# Patient Record
Sex: MALE | Race: WHITE | NOT HISPANIC OR LATINO | Employment: OTHER | URBAN - METROPOLITAN AREA
[De-identification: names, ages, dates, MRNs, and addresses within clinical notes are randomized per-mention and may not be internally consistent; named-entity substitution may affect disease eponyms.]

---

## 2016-04-20 LAB — CRC RECOMMENDATION EXT: NORMAL

## 2023-06-21 LAB
CHOLEST SERPL-MSCNC: 147 MG/DL (ref 0–200)
HDLC SERPL-MCNC: 67 MG/DL (ref 35–70)
LDLC SERPL CALC-MCNC: 65 MG/DL (ref 0–160)
TRIGL SERPL-MCNC: 41 MG/DL (ref 40–160)

## 2023-07-05 ENCOUNTER — DOCUMENTATION ONLY (OUTPATIENT)
Dept: FAMILY MEDICINE | Facility: CLINIC | Age: 68
End: 2023-07-05
Payer: MEDICARE

## 2023-07-05 PROBLEM — E87.5 HYPERKALEMIA: Status: ACTIVE | Noted: 2023-07-05

## 2023-07-05 PROBLEM — E53.8 FOLATE DEFICIENCY: Status: ACTIVE | Noted: 2023-07-05

## 2023-07-05 PROBLEM — I10 HTN (HYPERTENSION): Status: ACTIVE | Noted: 2023-07-05

## 2023-07-05 PROBLEM — D64.9 MILD ANEMIA: Status: ACTIVE | Noted: 2023-07-05

## 2023-07-05 PROBLEM — E05.90 HYPERTHYROIDISM: Status: ACTIVE | Noted: 2023-07-05

## 2023-07-05 PROBLEM — R79.89 ELEVATED LFTS: Status: ACTIVE | Noted: 2023-07-05

## 2023-07-05 PROBLEM — E78.5 ELEVATED LIPIDS: Status: ACTIVE | Noted: 2023-07-05

## 2023-07-05 RX ORDER — AMLODIPINE BESYLATE 5 MG/1
5 TABLET ORAL DAILY
COMMUNITY
End: 2023-10-19 | Stop reason: SDUPTHER

## 2023-07-05 RX ORDER — ATORVASTATIN CALCIUM 10 MG/1
10 TABLET, FILM COATED ORAL NIGHTLY
COMMUNITY
End: 2023-10-19 | Stop reason: SDUPTHER

## 2023-07-05 RX ORDER — LANOLIN ALCOHOL/MO/W.PET/CERES
100 CREAM (GRAM) TOPICAL DAILY
COMMUNITY
End: 2023-07-06 | Stop reason: ALTCHOICE

## 2023-07-05 RX ORDER — CYANOCOBALAMIN (VITAMIN B-12) 1000 MCG
500 TABLET, SUBLINGUAL SUBLINGUAL DAILY
COMMUNITY
End: 2023-07-06 | Stop reason: ALTCHOICE

## 2023-07-06 ENCOUNTER — OFFICE VISIT (OUTPATIENT)
Dept: FAMILY MEDICINE | Facility: CLINIC | Age: 68
End: 2023-07-06
Payer: MEDICARE

## 2023-07-06 VITALS
WEIGHT: 141.63 LBS | TEMPERATURE: 98 F | HEIGHT: 65 IN | SYSTOLIC BLOOD PRESSURE: 104 MMHG | OXYGEN SATURATION: 95 % | DIASTOLIC BLOOD PRESSURE: 74 MMHG | HEART RATE: 72 BPM | BODY MASS INDEX: 23.6 KG/M2

## 2023-07-06 DIAGNOSIS — E55.9 VITAMIN D DEFICIENCY: ICD-10-CM

## 2023-07-06 DIAGNOSIS — R79.89 ELEVATED LFTS: ICD-10-CM

## 2023-07-06 DIAGNOSIS — E78.5 ELEVATED LIPIDS: ICD-10-CM

## 2023-07-06 DIAGNOSIS — D72.819 LEUKOPENIA, UNSPECIFIED TYPE: ICD-10-CM

## 2023-07-06 DIAGNOSIS — F17.200 HEAVY SMOKER: ICD-10-CM

## 2023-07-06 DIAGNOSIS — F10.90 ALCOHOL USE DISORDER: ICD-10-CM

## 2023-07-06 DIAGNOSIS — F10.988 ALCOHOL-INDUCED THROMBOCYTOPENIA: ICD-10-CM

## 2023-07-06 DIAGNOSIS — K70.10 ALCOHOLIC HEPATITIS WITHOUT ASCITES: Primary | ICD-10-CM

## 2023-07-06 DIAGNOSIS — D69.59 ALCOHOL-INDUCED THROMBOCYTOPENIA: ICD-10-CM

## 2023-07-06 DIAGNOSIS — I10 PRIMARY HYPERTENSION: ICD-10-CM

## 2023-07-06 PROBLEM — H26.9 UNSPECIFIED CATARACT: Status: ACTIVE | Noted: 2023-07-06

## 2023-07-06 PROBLEM — Z79.899 ENCOUNTER FOR LONG-TERM (CURRENT) USE OF OTHER MEDICATIONS: Status: ACTIVE | Noted: 2023-07-06

## 2023-07-06 PROBLEM — D69.6 THROMBOCYTOPENIA: Status: ACTIVE | Noted: 2023-07-06

## 2023-07-06 PROBLEM — F10.10 EXCESSIVE DRINKING OF ALCOHOL: Status: ACTIVE | Noted: 2023-07-06

## 2023-07-06 PROCEDURE — 1126F AMNT PAIN NOTED NONE PRSNT: CPT | Mod: CPTII,,, | Performed by: FAMILY MEDICINE

## 2023-07-06 PROCEDURE — 1160F RVW MEDS BY RX/DR IN RCRD: CPT | Mod: CPTII,,, | Performed by: FAMILY MEDICINE

## 2023-07-06 PROCEDURE — 1126F PR PAIN SEVERITY QUANTIFIED, NO PAIN PRESENT: ICD-10-PCS | Mod: CPTII,,, | Performed by: FAMILY MEDICINE

## 2023-07-06 PROCEDURE — 3008F BODY MASS INDEX DOCD: CPT | Mod: CPTII,,, | Performed by: FAMILY MEDICINE

## 2023-07-06 PROCEDURE — 3288F FALL RISK ASSESSMENT DOCD: CPT | Mod: CPTII,,, | Performed by: FAMILY MEDICINE

## 2023-07-06 PROCEDURE — 99215 PR OFFICE/OUTPT VISIT, EST, LEVL V, 40-54 MIN: ICD-10-PCS | Mod: ,,, | Performed by: FAMILY MEDICINE

## 2023-07-06 PROCEDURE — 1159F PR MEDICATION LIST DOCUMENTED IN MEDICAL RECORD: ICD-10-PCS | Mod: CPTII,,, | Performed by: FAMILY MEDICINE

## 2023-07-06 PROCEDURE — 99215 OFFICE O/P EST HI 40 MIN: CPT | Mod: ,,, | Performed by: FAMILY MEDICINE

## 2023-07-06 PROCEDURE — 1101F PR PT FALLS ASSESS DOC 0-1 FALLS W/OUT INJ PAST YR: ICD-10-PCS | Mod: CPTII,,, | Performed by: FAMILY MEDICINE

## 2023-07-06 PROCEDURE — 3288F PR FALLS RISK ASSESSMENT DOCUMENTED: ICD-10-PCS | Mod: CPTII,,, | Performed by: FAMILY MEDICINE

## 2023-07-06 PROCEDURE — 3078F PR MOST RECENT DIASTOLIC BLOOD PRESSURE < 80 MM HG: ICD-10-PCS | Mod: CPTII,,, | Performed by: FAMILY MEDICINE

## 2023-07-06 PROCEDURE — 1101F PT FALLS ASSESS-DOCD LE1/YR: CPT | Mod: CPTII,,, | Performed by: FAMILY MEDICINE

## 2023-07-06 PROCEDURE — 3074F SYST BP LT 130 MM HG: CPT | Mod: CPTII,,, | Performed by: FAMILY MEDICINE

## 2023-07-06 PROCEDURE — 3008F PR BODY MASS INDEX (BMI) DOCUMENTED: ICD-10-PCS | Mod: CPTII,,, | Performed by: FAMILY MEDICINE

## 2023-07-06 PROCEDURE — 1160F PR REVIEW ALL MEDS BY PRESCRIBER/CLIN PHARMACIST DOCUMENTED: ICD-10-PCS | Mod: CPTII,,, | Performed by: FAMILY MEDICINE

## 2023-07-06 PROCEDURE — 3078F DIAST BP <80 MM HG: CPT | Mod: CPTII,,, | Performed by: FAMILY MEDICINE

## 2023-07-06 PROCEDURE — 1159F MED LIST DOCD IN RCRD: CPT | Mod: CPTII,,, | Performed by: FAMILY MEDICINE

## 2023-07-06 PROCEDURE — 3074F PR MOST RECENT SYSTOLIC BLOOD PRESSURE < 130 MM HG: ICD-10-PCS | Mod: CPTII,,, | Performed by: FAMILY MEDICINE

## 2023-07-06 RX ORDER — ACETAMINOPHEN 500 MG
2000 TABLET ORAL DAILY
Start: 2023-07-06

## 2023-07-06 NOTE — ASSESSMENT & PLAN NOTE
Most recent  U/L and  U/L on 6/21/23.  His alk-phos is normal at 56.  His ferritin level is elevated at 1955.  We had a long discussion about his continued alcohol consumption.  Unfortunately this was not the 1st discussion we have had about this matter.  I have repeatedly warned him about the resulting consequences of his alcohol consumption.  These consequences include but are not limited to cirrhosis of the liver as well as liver cancer.  I strongly advised him to quit or at least cut down his beer consumption.  I provided alcohol cessation counseling and offered to refer the patient to someone who specializes in alcohol cessation.  Unfortunately the patient was not interested in any of this.  I will continue to monitor this patient's liver functions as well as any other abnormal labs.

## 2023-07-06 NOTE — PROGRESS NOTES
"  Patient Name: Beka Pena     Patient ID: [id number]     Chief Complaint: Results (Patient here for lab results.)      HPI:     Beka Pena is a 68 y.o. male here today for lab work results. Reviewed and discussed lab work results.  Patient has a history of elevated LFTs secondary to excessive alcohol (beer) consumption.  Patient relates to me that he drinks close to a 6 pack of beer daily.  Past Medical History:   Diagnosis Date    B12 deficiency     Elevated LFTs     Due to excessive ETOH consumption    Elevated lipids     Folate deficiency     Heavy smoker     HTN (hypertension)     Hyperkalemia     Hyperthyroidism     Mild anemia     Unspecified cataract     Right Eye        Past Surgical History:   Procedure Laterality Date    TONSILLECTOMY AND ADENOIDECTOMY          Social History     Socioeconomic History    Marital status: Single    Number of children: 2   Tobacco Use    Smoking status: Every Day     Packs/day: 1.00     Years: 30.00     Pack years: 30.00     Types: Cigarettes    Smokeless tobacco: Never   Substance and Sexual Activity    Alcohol use: Yes     Alcohol/week: 14.0 standard drinks     Types: 14 Cans of beer per week    Drug use: Never        Current Outpatient Medications   Medication Instructions    amLODIPine (NORVASC) 5 mg, Oral, Daily    atorvastatin (LIPITOR) 10 mg, Oral, Nightly    cholecalciferol (vitamin D3) (VITAMIN D3) 2,000 Units, Oral, Daily       Review of patient's allergies indicates:  No Known Allergies       There is no immunization history on file for this patient.    Patient Care Team:  Joe Nelson Sr., MD as PCP - General (Family Medicine)     Subjective:     Review of Systems    10 point review of systems conducted, negative except as stated in the history of present illness. See HPI for details.    Objective:     Visit Vitals  /74 (BP Location: Left arm, Patient Position: Sitting, BP Method: Medium (Manual))   Pulse 72   Temp 98.1 °F (36.7 °C)   Ht 5' 5" " (1.651 m)   Wt 64.2 kg (141 lb 9.6 oz)   SpO2 95%   BMI 23.56 kg/m²       Physical Exam  Constitutional:       Appearance: Normal appearance.   HENT:      Head: Normocephalic and atraumatic.   Cardiovascular:      Rate and Rhythm: Normal rate and regular rhythm.      Heart sounds: Normal heart sounds.   Pulmonary:      Effort: Pulmonary effort is normal.      Breath sounds: Normal breath sounds.   Abdominal:      General: Abdomen is flat.      Palpations: Abdomen is soft.      Tenderness: There is no abdominal tenderness.      Comments: No ascites.  No right upper quadrant tenderness; liver is not palpable.   Musculoskeletal:         General: No swelling or tenderness. Normal range of motion.      Cervical back: Normal range of motion and neck supple.      Right lower leg: No edema.      Left lower leg: No edema.   Lymphadenopathy:      Cervical: No cervical adenopathy.   Skin:     General: Skin is warm and dry.   Neurological:      General: No focal deficit present.      Mental Status: He is alert and oriented to person, place, and time.   Psychiatric:         Mood and Affect: Mood normal.         Assessment:       ICD-10-CM ICD-9-CM   1. Alcoholic hepatitis without ascites  K70.10 571.1   2. Elevated LFTs  R79.89 790.6   3. Elevated lipids  E78.5 272.4   4. Primary hypertension  I10 401.9   5. Heavy smoker  F17.200 305.1   6. Vitamin D deficiency  E55.9 268.9   7. Leukopenia, unspecified type  D72.819 288.50   8. Alcohol use disorder  F10.90 V49.89   9. Alcohol-induced thrombocytopenia  F10.988 287.49    D69.59         Plan:     1. Alcoholic hepatitis without ascites  -     Gamma GT; Future; Expected date: 08/06/2023  -     Gamma GT; Future; Expected date: 11/06/2023    2. Elevated LFTs  Overview:  Patient's elevated LFTs secondary to excessive beer consumption.  He relates that he drinks close to a 6 pack of beer daily.    Assessment & Plan:  Most recent  U/L and  U/L on 6/21/23.  His alk-phos is  normal at 56.  His ferritin level is elevated at 1955.  We had a long discussion about his continued alcohol consumption.  Unfortunately this was not the 1st discussion we have had about this matter.  I have repeatedly warned him about the resulting consequences of his alcohol consumption.  These consequences include but are not limited to cirrhosis of the liver as well as liver cancer.  I strongly advised him to quit or at least cut down his beer consumption.  I provided alcohol cessation counseling and offered to refer the patient to someone who specializes in alcohol cessation.  Unfortunately the patient was not interested in any of this.  I will continue to monitor this patient's liver functions as well as any other abnormal labs.    Orders:  -     Gamma GT; Future; Expected date: 08/06/2023  -     Actin (Smooth Muscle) Antibody (IgG); Future; Expected date: 08/06/2023  -     LIVER-KIDNEY MICROSOME ABS; Future; Expected date: 08/06/2023  -     Mitochondrial (M2) Antibody; Future; Expected date: 08/06/2023  -     Comprehensive Metabolic Panel; Future; Expected date: 08/06/2023  -     Hepatitis B Core Antibody, Total; Future; Expected date: 08/06/2023  -     Hepatitis B Surface Ab, Qualitative; Future; Expected date: 08/06/2023  -     Hepatitis B Surface Antigen; Future; Expected date: 08/06/2023  -     Hepatitis C Antibody; Future; Expected date: 08/06/2023  -     Hepatitis A antibody, IgG; Future; Expected date: 08/06/2023  -     CT Abdomen Without Contrast; Future; Expected date: 08/06/2023  -     Comprehensive Metabolic Panel; Future; Expected date: 11/06/2023  -     Gamma GT; Future; Expected date: 11/06/2023    3. Elevated lipids  Overview:  Continue with Lipitor 10 mg nightly.  Patient is well controlled.  Stressed importance of dietary modifications. Follow a low cholesterol, low saturated fat diet with less that 200mg of cholesterol a day.  Avoid fried foods and high saturated fats (high saturated fats  less than 7% of calories).  Add Flax Seed/Fish Oil supplements to diet. Increase dietary fiber.  Regular exercise can reduce LDL and raise HDL. Stressed importance of physical activity 5 times per week for 30 minutes per day.     Assessment & Plan:  Most recent LDL 65 mg/dL on 6/21/23.      4. Primary hypertension  Overview:  Continue with Norvasc 5 mg daily.  Patient is well controlled.  Low Sodium Diet (DASH Diet - Less than 2 grams of sodium per day).  Monitor blood pressure daily and log. Report consistent numbers greater than 140/90.  Maintain healthy weight with goal BMI <30. Exercise 30 minutes per day, 5 days per week.  Patient was instructed to return to the office in 1 week for a blood pressure check as his blood pressure is on the low side today.  If it remains this low we will go ahead and stop his Norvasc.      5. Heavy smoker  Overview:  Patient was offered smoking cessation techniques such as nicotine gum or patches.  They were also offered a more regimented smoking cessation program.  They were advised to decrease the number of cigarettes by 1 every few days until they completely stopped.  It was strongly recommended that they set a quit date and stick to it.  And finally, important health reasons to stop smoking were given to the patient. Approximately    minutes was spent discussing smoking cessation.      6. Vitamin D deficiency  Assessment & Plan:  Most recent Vitamin D 24 ng/mL on 6/21/23.    Orders:  -     cholecalciferol, vitamin D3, (VITAMIN D3) 50 mcg (2,000 unit) Cap capsule; Take 1 capsule (2,000 Units total) by mouth once daily.    7. Leukopenia, unspecified type  Overview:  Most recent WBC 3.5 thous/cumm  on 6/21/23.  I explained to the patient that his white cell count is related to his body's immune function and capability.  I explained to him that his depressed white count is probably secondary to the alcohol.  Should his white cell count continue to go down he he will be at increased  risk for infection.    Orders:  -     Ferritin; Future; Expected date: 08/06/2023  -     Vitamin B12; Future; Expected date: 08/06/2023  -     Folate; Future; Expected date: 08/06/2023  -     Iron; Future; Expected date: 08/06/2023  -     CBC Auto Differential; Future; Expected date: 08/06/2023  -     CBC Auto Differential; Future; Expected date: 11/06/2023  -     APTT; Future; Expected date: 11/06/2023  -     Protime-INR; Future; Expected date: 11/06/2023    8. Alcohol use disorder  Overview:  Problems related to their alcohol use were discussed, including but not limited to liver inflammation cirrhosis and possible cancer.  Bleeding and blood disorders may also develop.  Patient was made aware that if they had other health problems such as hypertension or diabetes, alcohol consumption could possibly make these things worse.  Alcohol cessation counseling was given and it was strongly recommended that the patient stop or at least cut down on alcohol consumption.  The patient was offered a more formal alcohol cessation program such as AA or similar programs.  They were also offered inpatient therapy if that would be necessary.      9. Alcohol-induced thrombocytopenia  Overview:  Patient was made aware that his platelet count was below normal.  This is no doubt due to his excessive alcohol consumption.  He was cautioned about excessive bleeding if he happens to cut himself.  I told him that this was just 1 more reason that he needed to cut down or stop his alcohol use.          [x] Discussed lab findings with the patient.  [] Discussed diet, exercise and if appropriate, weight loss.  [x] Instructions and information, including risks and benefits of prescribed medication(s) have been reviewed with the patient and patient verbalizes understanding. Questions have been answered to the patient's satisfaction.  [] Appropriate counseling has been given regarding anxiety and depressive issues that were discussed today.  []  Any lab drawn today will be reviewed by physician at the time it is received and appropriate recommendations bill be made and discussed with patient.     Follow up in about 4 months (around 11/6/2023) for Labwork 1 month, Follow Up, With Fasting Labs prior to visit.   In addition to their scheduled follow up, the patient has also been instructed to follow up on as needed basis.     Future Appointments   Date Time Provider Department Center   8/7/2023  8:00 AM NURSE, [facility]   11/6/2023  8:15 AM MD [facility]   11/8/2023  8:15 AM Joe Nelson Sr., MD [facility] Nelson Sr, MD

## 2023-08-03 ENCOUNTER — TELEPHONE (OUTPATIENT)
Dept: FAMILY MEDICINE | Facility: CLINIC | Age: 68
End: 2023-08-03
Payer: MEDICARE

## 2023-08-07 ENCOUNTER — TELEPHONE (OUTPATIENT)
Dept: FAMILY MEDICINE | Facility: CLINIC | Age: 68
End: 2023-08-07

## 2023-08-07 PROBLEM — D69.6 THROMBOCYTOPENIA: Status: ACTIVE | Noted: 2023-08-07

## 2023-08-07 PROBLEM — D69.59 ALCOHOL-INDUCED THROMBOCYTOPENIA: Status: RESOLVED | Noted: 2023-07-06 | Resolved: 2023-08-07

## 2023-08-07 PROBLEM — K70.10 ALCOHOLIC HEPATITIS: Status: RESOLVED | Noted: 2023-07-06 | Resolved: 2023-08-07

## 2023-08-07 PROBLEM — F10.988 ALCOHOL-INDUCED THROMBOCYTOPENIA: Status: RESOLVED | Noted: 2023-07-06 | Resolved: 2023-08-07

## 2023-08-07 NOTE — TELEPHONE ENCOUNTER
----- Message from Joe Nelson Sr., MD sent at 7/19/2023 12:56 PM CDT -----  CT scan shows he has a fatty liver but no cirrhosis at this time.  He should be told that with continued drinking this fatty liver can eventually turned into cirrhosis.  They also noted that he has to fractures in his lower back that do not look serious.  Ask him if he has had any recent back injury.

## 2023-10-19 DIAGNOSIS — I10 PRIMARY HYPERTENSION: Primary | ICD-10-CM

## 2023-10-19 DIAGNOSIS — E78.5 ELEVATED LIPIDS: Primary | ICD-10-CM

## 2023-10-19 RX ORDER — AMLODIPINE BESYLATE 5 MG/1
5 TABLET ORAL DAILY
Qty: 90 TABLET | Refills: 3 | Status: SHIPPED | OUTPATIENT
Start: 2023-10-19

## 2023-10-19 RX ORDER — ATORVASTATIN CALCIUM 10 MG/1
10 TABLET, FILM COATED ORAL NIGHTLY
Qty: 90 TABLET | Refills: 3 | Status: SHIPPED | OUTPATIENT
Start: 2023-10-19 | End: 2023-10-20 | Stop reason: ALTCHOICE

## 2023-10-20 DIAGNOSIS — E78.5 ELEVATED LIPIDS: ICD-10-CM

## 2023-10-20 DIAGNOSIS — I10 PRIMARY HYPERTENSION: ICD-10-CM

## 2023-10-20 RX ORDER — ROSUVASTATIN CALCIUM 10 MG/1
10 TABLET, COATED ORAL DAILY
Qty: 90 TABLET | Refills: 1 | Status: SHIPPED | OUTPATIENT
Start: 2023-10-20

## 2023-11-06 PROCEDURE — 82977 ASSAY OF GGT: CPT | Performed by: FAMILY MEDICINE

## 2023-11-06 PROCEDURE — 85025 COMPLETE CBC W/AUTO DIFF WBC: CPT | Performed by: FAMILY MEDICINE

## 2023-11-06 PROCEDURE — 86376 MICROSOMAL ANTIBODY EACH: CPT | Performed by: FAMILY MEDICINE

## 2023-11-06 PROCEDURE — 86803 HEPATITIS C AB TEST: CPT | Performed by: FAMILY MEDICINE

## 2023-11-06 PROCEDURE — 82746 ASSAY OF FOLIC ACID SERUM: CPT | Performed by: FAMILY MEDICINE

## 2023-11-06 PROCEDURE — 80053 COMPREHEN METABOLIC PANEL: CPT | Performed by: FAMILY MEDICINE

## 2023-11-06 PROCEDURE — 86704 HEP B CORE ANTIBODY TOTAL: CPT | Performed by: FAMILY MEDICINE

## 2023-11-06 PROCEDURE — 82728 ASSAY OF FERRITIN: CPT | Performed by: FAMILY MEDICINE

## 2023-11-06 PROCEDURE — 86708 HEPATITIS A ANTIBODY: CPT | Performed by: FAMILY MEDICINE

## 2023-11-06 PROCEDURE — 83516 IMMUNOASSAY NONANTIBODY: CPT | Performed by: FAMILY MEDICINE

## 2023-11-06 PROCEDURE — 85610 PROTHROMBIN TIME: CPT | Performed by: FAMILY MEDICINE

## 2023-11-06 PROCEDURE — 83540 ASSAY OF IRON: CPT | Performed by: FAMILY MEDICINE

## 2023-11-06 PROCEDURE — 85730 THROMBOPLASTIN TIME PARTIAL: CPT | Performed by: FAMILY MEDICINE

## 2023-11-06 PROCEDURE — 82607 VITAMIN B-12: CPT | Performed by: FAMILY MEDICINE

## 2024-02-01 ENCOUNTER — DOCUMENTATION ONLY (OUTPATIENT)
Dept: FAMILY MEDICINE | Facility: CLINIC | Age: 69
End: 2024-02-01

## 2024-04-18 DIAGNOSIS — E78.5 ELEVATED LIPIDS: ICD-10-CM

## 2024-04-18 RX ORDER — ROSUVASTATIN CALCIUM 10 MG/1
10 TABLET, COATED ORAL DAILY
Qty: 90 TABLET | Refills: 0 | Status: SHIPPED | OUTPATIENT
Start: 2024-04-18

## 2024-07-01 PROCEDURE — 80053 COMPREHEN METABOLIC PANEL: CPT | Performed by: FAMILY MEDICINE

## 2024-07-01 PROCEDURE — 80061 LIPID PANEL: CPT | Performed by: FAMILY MEDICINE

## 2024-07-01 PROCEDURE — 84443 ASSAY THYROID STIM HORMONE: CPT | Performed by: FAMILY MEDICINE

## 2024-07-01 PROCEDURE — 84550 ASSAY OF BLOOD/URIC ACID: CPT | Performed by: FAMILY MEDICINE

## 2024-07-01 PROCEDURE — 82977 ASSAY OF GGT: CPT | Performed by: FAMILY MEDICINE

## 2024-07-01 PROCEDURE — 82306 VITAMIN D 25 HYDROXY: CPT | Performed by: FAMILY MEDICINE

## 2024-07-01 PROCEDURE — 85025 COMPLETE CBC W/AUTO DIFF WBC: CPT | Performed by: FAMILY MEDICINE

## 2024-07-01 PROCEDURE — 82550 ASSAY OF CK (CPK): CPT | Performed by: FAMILY MEDICINE
